# Patient Record
Sex: FEMALE | Race: WHITE
[De-identification: names, ages, dates, MRNs, and addresses within clinical notes are randomized per-mention and may not be internally consistent; named-entity substitution may affect disease eponyms.]

---

## 2018-12-26 ENCOUNTER — HOSPITAL ENCOUNTER (OUTPATIENT)
Dept: HOSPITAL 74 - JMAMMO-SUR | Age: 78
Discharge: HOME | End: 2018-12-26
Attending: INTERNAL MEDICINE
Payer: COMMERCIAL

## 2018-12-26 DIAGNOSIS — C50.912: Primary | ICD-10-CM

## 2018-12-26 PROCEDURE — 0HBU3ZX EXCISION OF LEFT BREAST, PERCUTANEOUS APPROACH, DIAGNOSTIC: ICD-10-PCS

## 2018-12-28 NOTE — PATH
Surgical Pathology Report



Patient Name:  CHELO NAVARRETE

Accession #:  [id number]

Med. Rec. #:  [id number]                                                        

   /Age/Gender:  1940 (Age: 78) / F

Account:  [id number]                                                          

             Location: [facility]

Taken:  2018

Received:  2018

Reported:  2018

Physicians:  AVEYR Joseph M.D.

  



Specimen(s) Received

 LEFT BREAST 10:00 MASS US-GUIDED CORE BIOPSY 





Clinical History

Nonpalpable lesion

Mammographic findings/ultrasound findings: Suspicious

0.9 cm irregular mass left breast







Final Diagnosis

Breast, left, 10:00, mass, US-guided core biopsy:

Invasive ductal carcinoma, well differentiated with associated calcifications,

measuring 6 mm in greatest dimension In this material. (See note).

Focal ductal carcinoma in situ (DCIS.), Solid and cribriform type, low nuclear

grade.



Note: The carcinoma shows strong membranous positivity for E-Cadherin (performed

at [facility]), which supports ductal phenotype.



Results of ER and [place] studies performed at [facility] are as

follows:

ER (clone [id number] mouse monoclonal antibody by [employer]): > 95 % nuclear staining with

strong intensity (Positive).

[place] (clone16 mouse monoclonal antibody by [employer]): ~90 % nuclear staining with

strong intensity (Positive).



Results of Her2 IHC & Ki67 studies will be reported separately in an addendum.

Case discussed with Dr.Grace Estrada on 18.



Positive and negative controls (internal if applicable) show appropriate

results.

Formalin fixation and cold ischemic times are within current ASCO/CAP

recommendations for ER, [place] and Her2 testing.





***Electronically Signed***

Georgia Lawler M.D.



Addendum     

Reported: 2018



Addendum Diagnosis

Results of Her2 (IHC) & Ki-67 studies performed at [place], [place] ([id number]) are as follows:

Her2 IHC (EP3 from [employer], formerly known as [id number], using Bond Polymer Refine

detection kit):0 (Negative).

Ki-67: < 5% (low proliferative index).



Positive and negative controls (internal if applicable) show appropriate

results.







 Georgia Lawler M.D.  

 



Gross Description

Received in formalin labeled "left breast 10:00 mass core biopsy," are 4

tan-yellow, cylindrical portions of fibroadipose tissue ranging from 1.0-1.5 cm

in length and averaging 0.1 cm in diameter. The specimens are submitted in toto

in one cassette.

Time to formalin fixation: < 1 minute

Total formalin fixation time: Approximately 7 hours

## 2021-09-10 ENCOUNTER — HOSPITAL ENCOUNTER (EMERGENCY)
Dept: HOSPITAL 74 - JER | Age: 81
LOS: 1 days | Discharge: HOME | End: 2021-09-11
Payer: COMMERCIAL

## 2021-09-10 VITALS — DIASTOLIC BLOOD PRESSURE: 74 MMHG | HEART RATE: 71 BPM | SYSTOLIC BLOOD PRESSURE: 172 MMHG

## 2021-09-10 VITALS — TEMPERATURE: 98.3 F

## 2021-09-10 VITALS — BODY MASS INDEX: 28.3 KG/M2

## 2021-09-10 DIAGNOSIS — T78.40XA: Primary | ICD-10-CM

## 2021-09-10 LAB
ALBUMIN SERPL-MCNC: 4.4 G/DL (ref 3.4–5)
ALP SERPL-CCNC: 94 U/L (ref 45–117)
ALT SERPL-CCNC: 26 U/L (ref 13–61)
ANION GAP SERPL CALC-SCNC: 5 MMOL/L (ref 8–16)
AST SERPL-CCNC: 25 U/L (ref 15–37)
BASOPHILS # BLD: 0.3 % (ref 0–2)
BILIRUB SERPL-MCNC: 0.5 MG/DL (ref 0.2–1)
BUN SERPL-MCNC: 16.8 MG/DL (ref 7–18)
CALCIUM SERPL-MCNC: 10.1 MG/DL (ref 8.5–10.1)
CHLORIDE SERPL-SCNC: 104 MMOL/L (ref 98–107)
CO2 SERPL-SCNC: 34 MMOL/L (ref 21–32)
CREAT SERPL-MCNC: 0.7 MG/DL (ref 0.55–1.3)
DEPRECATED RDW RBC AUTO: 13.6 % (ref 11.6–15.6)
EOSINOPHIL # BLD: 1 % (ref 0–4.5)
GLUCOSE SERPL-MCNC: 112 MG/DL (ref 74–106)
HCT VFR BLD CALC: 38.2 % (ref 32.4–45.2)
HGB BLD-MCNC: 13.2 GM/DL (ref 10.7–15.3)
LYMPHOCYTES # BLD: 30.3 % (ref 8–40)
MCH RBC QN AUTO: 31.1 PG (ref 25.7–33.7)
MCHC RBC AUTO-ENTMCNC: 34.5 G/DL (ref 32–36)
MCV RBC: 90.4 FL (ref 80–96)
MONOCYTES # BLD AUTO: 6.5 % (ref 3.8–10.2)
NEUTROPHILS # BLD: 61.9 % (ref 42.8–82.8)
PLATELET # BLD AUTO: 209 10^3/UL (ref 134–434)
PMV BLD: 9.9 FL (ref 7.5–11.1)
PROT SERPL-MCNC: 8.5 G/DL (ref 6.4–8.2)
RBC # BLD AUTO: 4.23 M/MM3 (ref 3.6–5.2)
SODIUM SERPL-SCNC: 143 MMOL/L (ref 136–145)
WBC # BLD AUTO: 10.7 K/MM3 (ref 4–10)

## 2021-09-10 PROCEDURE — U0003 INFECTIOUS AGENT DETECTION BY NUCLEIC ACID (DNA OR RNA); SEVERE ACUTE RESPIRATORY SYNDROME CORONAVIRUS 2 (SARS-COV-2) (CORONAVIRUS DISEASE [COVID-19]), AMPLIFIED PROBE TECHNIQUE, MAKING USE OF HIGH THROUGHPUT TECHNOLOGIES AS DESCRIBED BY CMS-2020-01-R: HCPCS

## 2021-09-10 PROCEDURE — 3E033GC INTRODUCTION OF OTHER THERAPEUTIC SUBSTANCE INTO PERIPHERAL VEIN, PERCUTANEOUS APPROACH: ICD-10-PCS

## 2021-09-10 PROCEDURE — C9803 HOPD COVID-19 SPEC COLLECT: HCPCS

## 2021-09-10 PROCEDURE — U0005 INFEC AGEN DETEC AMPLI PROBE: HCPCS

## 2021-09-10 PROCEDURE — 3E033NZ INTRODUCTION OF ANALGESICS, HYPNOTICS, SEDATIVES INTO PERIPHERAL VEIN, PERCUTANEOUS APPROACH: ICD-10-PCS
